# Patient Record
Sex: FEMALE | Race: BLACK OR AFRICAN AMERICAN
[De-identification: names, ages, dates, MRNs, and addresses within clinical notes are randomized per-mention and may not be internally consistent; named-entity substitution may affect disease eponyms.]

---

## 2016-11-23 VITALS — DIASTOLIC BLOOD PRESSURE: 68 MMHG | SYSTOLIC BLOOD PRESSURE: 119 MMHG

## 2017-04-18 ENCOUNTER — HOSPITAL ENCOUNTER (OUTPATIENT)
Dept: HOSPITAL 61 - 3 SO LND | Age: 37
Setting detail: OBSERVATION
Discharge: ANOTHER HOSPITAL | End: 2017-04-18
Attending: SPECIALIST | Admitting: SPECIALIST
Payer: MEDICARE

## 2017-04-18 DIAGNOSIS — Z3A.29: ICD-10-CM

## 2017-04-18 LAB
ALBUMIN SERPL-MCNC: 2.4 G/DL (ref 3.4–5)
ALBUMIN/GLOB SERPL: 0.7 {RATIO} (ref 1–1.7)
ALP SERPL-CCNC: 109 U/L (ref 46–116)
ALT SERPL-CCNC: 15 U/L (ref 14–59)
ANION GAP SERPL CALC-SCNC: 10 MMOL/L (ref 6–14)
AST SERPL-CCNC: 17 U/L (ref 15–37)
BARBITURATES UR-MCNC: (no result) UG/ML
BASOPHILS # BLD AUTO: 0 X10^3/UL (ref 0–0.2)
BASOPHILS NFR BLD: 0 % (ref 0–3)
BENZODIAZ UR-MCNC: (no result) UG/L
BILIRUB SERPL-MCNC: 0.3 MG/DL (ref 0.2–1)
BUN SERPL-MCNC: 4 MG/DL (ref 7–20)
BUN/CREAT SERPL: 8 (ref 6–20)
CALCIUM SERPL-MCNC: 8.4 MG/DL (ref 8.5–10.1)
CANNABINOIDS UR-MCNC: (no result) UG/L
CHLORIDE SERPL-SCNC: 105 MMOL/L (ref 98–107)
CO2 SERPL-SCNC: 24 MMOL/L (ref 21–32)
COCAINE UR-MCNC: (no result) NG/ML
CREAT SERPL-MCNC: 0.5 MG/DL (ref 0.6–1)
EOSINOPHIL NFR BLD: 1 % (ref 0–3)
ERYTHROCYTE [DISTWIDTH] IN BLOOD BY AUTOMATED COUNT: 13.3 % (ref 11.5–14.5)
ETHANOL, URINE: (no result)
GFR SERPLBLD BASED ON 1.73 SQ M-ARVRAT: 169.9 ML/MIN
GLOBULIN SER-MCNC: 3.6 G/DL (ref 2.2–3.8)
GLUCOSE SERPL-MCNC: 94 MG/DL (ref 70–99)
HCT VFR BLD CALC: 33.1 % (ref 36–47)
HGB BLD-MCNC: 11 G/DL (ref 12–15.5)
LYMPHOCYTES # BLD: 2.6 X10^3/UL (ref 1–4.8)
LYMPHOCYTES NFR BLD AUTO: 22 % (ref 24–48)
MCH RBC QN AUTO: 31 PG (ref 25–35)
MCHC RBC AUTO-ENTMCNC: 33 G/DL (ref 31–37)
MCV RBC AUTO: 92 FL (ref 79–100)
METHADONE SERPL-MCNC: (no result) NG/ML
MONOCYTES NFR BLD: 10 % (ref 0–9)
NEG OBC AMNIO: (no result)
NEUTROPHILS NFR BLD AUTO: 67 % (ref 31–73)
OPIATES UR-MCNC: (no result) NG/ML
PCP SERPL-MCNC: (no result) MG/DL
PLATELET # BLD AUTO: 222 X10^3/UL (ref 140–400)
POS OBC AMNIO: (no result)
POTASSIUM SERPL-SCNC: 3.7 MMOL/L (ref 3.5–5.1)
PROT SERPL-MCNC: 6 G/DL (ref 6.4–8.2)
RBC # BLD AUTO: 3.59 X10^6/UL (ref 3.5–5.4)
SODIUM SERPL-SCNC: 139 MMOL/L (ref 136–145)
WBC # BLD AUTO: 11.8 X10^3/UL (ref 4–11)

## 2017-04-18 PROCEDURE — G0379 DIRECT REFER HOSPITAL OBSERV: HCPCS

## 2017-04-18 PROCEDURE — 84112 EVAL AMNIOTIC FLUID PROTEIN: CPT

## 2017-04-18 PROCEDURE — 86593 SYPHILIS TEST NON-TREP QUANT: CPT

## 2017-04-18 PROCEDURE — 96367 TX/PROPH/DG ADDL SEQ IV INF: CPT

## 2017-04-18 PROCEDURE — G0378 HOSPITAL OBSERVATION PER HR: HCPCS

## 2017-04-18 PROCEDURE — 87653 STREP B DNA AMP PROBE: CPT

## 2017-04-18 PROCEDURE — 96368 THER/DIAG CONCURRENT INF: CPT

## 2017-04-18 PROCEDURE — 87591 N.GONORRHOEAE DNA AMP PROB: CPT

## 2017-04-18 PROCEDURE — 96365 THER/PROPH/DIAG IV INF INIT: CPT

## 2017-04-18 PROCEDURE — 86850 RBC ANTIBODY SCREEN: CPT

## 2017-04-18 PROCEDURE — 76805 OB US >/= 14 WKS SNGL FETUS: CPT

## 2017-04-18 PROCEDURE — 36415 COLL VENOUS BLD VENIPUNCTURE: CPT

## 2017-04-18 PROCEDURE — 86901 BLOOD TYPING SEROLOGIC RH(D): CPT

## 2017-04-18 PROCEDURE — 85027 COMPLETE CBC AUTOMATED: CPT

## 2017-04-18 PROCEDURE — 86900 BLOOD TYPING SEROLOGIC ABO: CPT

## 2017-04-18 PROCEDURE — 87491 CHLMYD TRACH DNA AMP PROBE: CPT

## 2017-04-18 PROCEDURE — 80053 COMPREHEN METABOLIC PANEL: CPT

## 2017-04-18 NOTE — PDOC1
OB - History


Hx of Present Pregnancy


Prenatal Care:  Good Care


Ultrasounds:  Normal mid trimester US


Obstetrical Complications:  None, Other (H/o PTL/PTD x 2 at 29 wks and 33 wks)


Medical Complications:  None





Past Family/Social History


*


Past Medical, Surgical, Family and Obstetric Histories reviewed from prenatal 

chart.


Blood Type:  A-


Rubella:  Immune


RPR/VDRL:  Negative


GBS Status:  Unknown


HBsAG:  Negative





OB - Chief Complaint & HPI


Date of Admission:


Date of Admission:  2017 at 08:24


Chief Complaint/History


:  3


Para:  2


EGA:  29


Reason for admission:  rupture of membranes


Admission Nurse Assessment Rev:  Yes


Problems:  





OB - Admission Exam


Physical Exam


HEENT:  Normal


Heart:  Regular Rate


Lungs:  Clear, Equal


Abdomen:  Non tender, Soft


Extremities:  Edema


Reflexes:  Normal


Membranes:  Ruptured


Amniotic Fluid:  Clear


Fetal Heart Rate:  Normal


Accelerations:  Accelerations Present


Decelerations:  No decelerations


Contractions on Admission:  None


Intensity:  Mild


Text


A: 29 wks IUP


    PPROM


    Vertex: sono wt. 1385 gm


P: Admit for IV abx,  corticosteroids and magnesium sulfate.  Will 

transfer to [facility] under Dr. Cortez.  Will complete cervical cx here.








HOWARD CHO Jr, MD 2017 10:44

## 2017-04-18 NOTE — RAD
Limited obstetrical ultrasound, 4/18/2017:



History: Ruptured membranes, check fetal position



No previous studies are available for comparison purposes. Transabdominal

scans were obtained. There is a single intrauterine fetus in a cephalic

orientation. The fetal biparietal diameter measures 7.2 cm compatible with a

gestational age of 29 weeks. This corresponds well to the other fetal

measurements and yields a sonographic EDC of 7/2/2017. Fetal activity and

heart motion are seen. The fetal heart rate was 155 bpm. A full fetal survey

was not attempted at this time. The fetal weight is estimated at 3 pounds and

1 ounce +/- 7 ounces. There is very little amniotic fluid compatible with the

history of ruptured membranes. The CORTES was calculated at 4.2. The placenta

lies anteriorly extending into the fundal region. There is no evidence of a

placenta previa. The cervix was obscured by the fetal head.



IMPRESSION:

1. Single viable intrauterine fetus of 29 weeks gestational age.

2. Oligohydramnios

## 2018-11-14 ENCOUNTER — HOSPITAL ENCOUNTER (EMERGENCY)
Dept: HOSPITAL 61 - ER | Age: 38
Discharge: HOME | End: 2018-11-14
Payer: MEDICARE

## 2018-11-14 VITALS — SYSTOLIC BLOOD PRESSURE: 112 MMHG | DIASTOLIC BLOOD PRESSURE: 71 MMHG

## 2018-11-14 VITALS — BODY MASS INDEX: 28.93 KG/M2 | WEIGHT: 180 LBS | HEIGHT: 66 IN

## 2018-11-14 DIAGNOSIS — N39.0: Primary | ICD-10-CM

## 2018-11-14 DIAGNOSIS — F20.9: ICD-10-CM

## 2018-11-14 DIAGNOSIS — F15.10: ICD-10-CM

## 2018-11-14 DIAGNOSIS — Z88.8: ICD-10-CM

## 2018-11-14 LAB
APTT PPP: YELLOW S
BACTERIA #/AREA URNS HPF: (no result) /HPF
BILIRUB UR QL STRIP: NEGATIVE
FIBRINOGEN PPP-MCNC: (no result) MG/DL
NITRITE UR QL STRIP: POSITIVE
PH UR STRIP: 5.5 [PH]
PROT UR STRIP-MCNC: NEGATIVE MG/DL
RBC #/AREA URNS HPF: (no result) /HPF (ref 0–2)
SQUAMOUS #/AREA URNS LPF: (no result) /LPF
UROBILINOGEN UR-MCNC: 0.2 MG/DL
WBC #/AREA URNS HPF: (no result) /HPF (ref 0–4)

## 2018-11-14 PROCEDURE — 81025 URINE PREGNANCY TEST: CPT

## 2018-11-14 PROCEDURE — 81001 URINALYSIS AUTO W/SCOPE: CPT

## 2018-11-14 PROCEDURE — 87086 URINE CULTURE/COLONY COUNT: CPT

## 2018-11-14 PROCEDURE — 99284 EMERGENCY DEPT VISIT MOD MDM: CPT

## 2018-11-14 NOTE — PHYS DOC
Past Medical History


Past Medical History:  Schizophrenia


Past Surgical History:  No Surgical History


Alcohol Use:  Occasionally


Drug Use:  Marijuana, Methamphetamine, Phencyclidine





Adult General


Chief Complaint


Chief Complaint:  NAUSEA/VOMITING/DIARRHA





HPI


HPI





Patient is a 38  year old  female sent with episodic nausea 

vomiting the past 3 days. Patient last vomited 10 hours prior to ED arrival is 

tolerated fluids and food since that time. Reports occasional abdominal 

cramping but currently denies any pain. Patient states she developed symptoms 

after using methamphetamine PCP and marijuana. No chest pain palpitations 

shortness of breath. No fever chills, sweats, flank pain, urinary frequency 

urgency. His dizziness or lightheadedness. No other acute symptoms or 

complaints. Last menstrual period was 3 weeks ago.[]





Review of Systems


Review of Systems


Review symptoms as per history of present illness. All other review symptoms 

are negative.


All other systems were reviewed and found to be within normal limits, except as 

documented in this note.





Current Medications


Current Medications





Current Medications








 Medications


  (Trade)  Dose


 Ordered  Sig/Romulo  Start Time


 Stop Time Status Last Admin


Dose Admin


 


 Famotidine


  (Pepcid)  20 mg  1X  ONCE  11/14/18 18:45


 11/14/18 18:46 DC 11/14/18 18:52


20 MG


 


 Ondansetron HCl


  (Zofran Odt)  8 mg  1X  ONCE  11/14/18 18:45


 11/14/18 18:46 DC 11/14/18 18:52


8 MG











Allergies


Allergies





Allergies








Coded Allergies Type Severity Reaction Last Updated Verified


 


  haloperidol Allergy Intermediate  1/23/16 Yes











Physical Exam


Physical Exam





Constitutional: Well developed, well nourished, no acute distress, non-toxic 

appearance. []


HENT: Normocephalic, atraumatic, bilateral external ears normal, oropharynx 

moist, no oral exudates, nose normal. []


Eyes: PERRLA, EOMI, conjunctiva normal, no discharge. [] 


Neck: Normal range of motion, no tenderness. [] 


Cardiovascular:Heart rate regular rhythm, no murmur []


Lungs & Thorax:  Bilateral breath sounds clear to auscultation []


Abdomen: Bowel sounds normal, soft. No tenderness,, no abdominal pain or 

tenderness. M, dry, no erythema, no rash. [] 


Back: No tenderness. [] 


Extremities: No tenderness, no cyanosis, no clubbing, ROM intact, no edema. [] 


Neurologic: Alert and oriented X 3, normal motor function, normal sensory 

function, no focal deficits noted. []


Psychologic: Affect normal, judgement normal, mood normal. []





Current Patient Data


Vital Signs





 Vital Signs








  Date Time  Temp Pulse Resp B/P (MAP) Pulse Ox O2 Delivery O2 Flow Rate FiO2


 


11/14/18 18:00 97.6 92 18 148/82 (104) 100 Room Air  





 97.6       








Lab Values





 Laboratory Tests








Test


 11/14/18


18:00


 


Urine Collection Type Unknown  


 


Urine Color Yellow  


 


Urine Clarity Cloudy  


 


Urine pH 5.5  


 


Urine Specific Gravity 1.015  


 


Urine Protein


 Negative mg/dL


(NEG-TRACE)


 


Urine Glucose (UA)


 Negative mg/dL


(NEG)


 


Urine Ketones (Stick)


 Negative mg/dL


(NEG)


 


Urine Blood Small (NEG)  


 


Urine Nitrite


 Positive (NEG)





 


Urine Bilirubin


 Negative (NEG)





 


Urine Urobilinogen Dipstick


 0.2 mg/dL (0.2


mg/dL)


 


Urine Leukocyte Esterase Large (NEG)  


 


Urine RBC


 3-5 /HPF (0-2)





 


Urine WBC


 Tntc /HPF


(0-4)


 


Urine Squamous Epithelial


Cells Mod /LPF  





 


Urine Bacteria


 Many /HPF


(0-FEW)


 


Urine Mucus Mod /LPF  











EKG


EKG


[]





Radiology/Procedures


Radiology/Procedures


[]





Course & Med Decision Making


Course & Med Decision Making


Pertinent Labs and Imaging studies reviewed. (See chart for details)





[Soft Soft, nonsurgical. No vomiting in the emergency department. Symptoms 

resolved with treatment. Recommend supportive care, treatment for urinary tract 

infection and follow-up with PCP. Cautions reviewed.]





Dragon Disclaimer


Dragon Disclaimer


This electronic medical record was generated, in whole or in part, using a 

voice recognition dictation system.





Departure


Departure


Impression:  


 Primary Impression:  


 Urinary tract infection


 Additional Impression:  


 Nausea and vomiting


Disposition:  01 HOME, SELF-CARE


Condition:  GOOD


Patient Instructions:  Nausea and Vomiting, Easy-to-Read, Urinary Tract 

Infection, Easy-to-Read





Additional Instructions:  


You were evaluated in the emergency department for nausea vomiting. Lab work 

was obtained and is consistent with urinary tract infection. Please increase 

fluids take nausea medication and biotics as directed and up with her PCP in 2-

3 days if symptoms persist. Return to ED if new or worsening symptoms.


Scripts


Ondansetron (ZOFRAN ODT) 4 Mg Tab.rapdis


1 TAB SL Q8HRS, #10 TAB


   Prov: JULIO FLORES DO         11/14/18 


Nitrofurantoin Monohyd/M-Cryst (MACROBID 100 MG CAPSULE) 100 Mg Capsule


1 CAP PO BID, #20 CAP


   Prov: JULIO FLORES DO         11/14/18





Problem Qualifiers











JULIO FLORES DO Nov 14, 2018 19:22